# Patient Record
Sex: FEMALE | Race: BLACK OR AFRICAN AMERICAN | NOT HISPANIC OR LATINO | Employment: FULL TIME | URBAN - METROPOLITAN AREA
[De-identification: names, ages, dates, MRNs, and addresses within clinical notes are randomized per-mention and may not be internally consistent; named-entity substitution may affect disease eponyms.]

---

## 2021-04-16 ENCOUNTER — BULK ORDERING (OUTPATIENT)
Dept: CASE MANAGEMENT | Facility: OTHER | Age: 21
End: 2021-04-16

## 2021-04-16 DIAGNOSIS — Z23 IMMUNIZATION DUE: ICD-10-CM

## 2021-11-04 ENCOUNTER — HOSPITAL ENCOUNTER (EMERGENCY)
Facility: HOSPITAL | Age: 21
Discharge: HOME | End: 2021-11-04
Attending: EMERGENCY MEDICINE | Admitting: EMERGENCY MEDICINE

## 2021-11-04 VITALS
HEIGHT: 66 IN | WEIGHT: 243 LBS | BODY MASS INDEX: 39.05 KG/M2 | SYSTOLIC BLOOD PRESSURE: 150 MMHG | DIASTOLIC BLOOD PRESSURE: 99 MMHG | RESPIRATION RATE: 18 BRPM | OXYGEN SATURATION: 99 % | HEART RATE: 111 BPM | TEMPERATURE: 98.7 F

## 2021-11-04 DIAGNOSIS — L02.412 ABSCESS OF LEFT AXILLA: Primary | ICD-10-CM

## 2021-11-04 PROCEDURE — 99282 EMERGENCY DEPT VISIT SF MDM: CPT

## 2021-11-04 RX ORDER — LIDOCAINE HYDROCHLORIDE AND EPINEPHRINE 10; 10 MG/ML; UG/ML
10 INJECTION, SOLUTION INFILTRATION; PERINEURAL ONCE
Status: COMPLETED | OUTPATIENT
Start: 2021-11-04 | End: 2021-11-04

## 2021-11-04 RX ADMIN — LIDOCAINE HYDROCHLORIDE,EPINEPHRINE BITARTRATE 5 ML: 10; .01 INJECTION, SOLUTION INFILTRATION; PERINEURAL at 12:26

## 2021-11-04 NOTE — ED TRIAGE NOTES
Pt c/o abscess to left axilla that started couple weeks ago. Pt was seen at [facility] 1wk ago and placed on abx. Pt states that it is worse    Pt and staff wearing appropriated ppe

## 2021-11-04 NOTE — DISCHARGE INSTRUCTIONS
Keep your appointment with Dr. Lunsford tomorrow    Change the bandage 2-3 times daily.  After 2 days, remove the packing. Wash hands, remove gently, and then wash hands again. Anything that drains from this area is considered infections/contagious.    Return to the ER for fever >100.4, vomiting, spreading redness, increasing pain, any concerns.

## 2021-11-04 NOTE — ED NOTES
.Pt masked on arrival, RN wearing mask/goggles during encounter       Sergio Lagunas, RN  11/04/21 2418

## 2021-11-04 NOTE — ED PROVIDER NOTES
Pt presents to the ED complaining of infection under her left armpit.  Patient states she noticed swelling under her left armpit several weeks ago.  She states that last week she was seen at [facility] and was told that they could not open it but placed her antibiotics.  She states the area is continued to grow and that she has an appointment to see the surgeon-Dr. Lunsford tomorrow but felt like it is bothering her enough today she presented to our emergency room for evaluation.  She states that her mother has a history of the same and has had her sweat glands removed.    On exam, pt is alert and orient x3 no acute distress  Abscess in her left axilla with fluctuance and minimal surrounding erythema  Neurovascular intact in left arm    I agree with midlevel plan to I&D the wound, place her antibiotics and have her follow-up with surgery tomorrow as scheduled    PPE  Pt does not present with symptoms for COVID19; however, I was wearing a N95 mask and goggles throughout all patient interaction.        The [facility] and I have discussed this patient's history, physical exam, and treatment plan.  I have reviewed the documentation and personally had a face to face interaction with the patient. I affirm the documentation and agree with the treatment and plan.  The attached note describes my personal findings.           Daquan Duarte MD  11/04/21 5869

## 2021-11-04 NOTE — ED PROVIDER NOTES
EMERGENCY DEPARTMENT ENCOUNTER    Room Number:  A01/01  Date seen:  11/4/2021  Time seen: 12:43 EDT  PCP: Robert Mike MD  Historian: patient      HPI:  Chief Complaint: left underarm abscess    A complete HPI/ROS/PMH/PSH/SH/FH are unobtainable due to: none    Context: Lorna Mckenzie is a 21 y.o. female who presents to the ED for evaluation of an abscess in her left axilla present for approximately 2 weeks that is constant severe and is gradually getting worse.  She states she was evaluated a few days ago in an emergency room and prescribed antibiotics which did help some.  She denies any fevers chills known history of diabetes or vomiting.  She has an appointment with a surgeon tomorrow but states it was so uncomfortable she could not wait any longer.        PAST MEDICAL HISTORY  Active Ambulatory Problems     Diagnosis Date Noted   • No Active Ambulatory Problems     Resolved Ambulatory Problems     Diagnosis Date Noted   • No Resolved Ambulatory Problems     No Additional Past Medical History         PAST SURGICAL HISTORY  History reviewed. No pertinent surgical history.      FAMILY HISTORY  History reviewed. No pertinent family history.      SOCIAL HISTORY  Social History     Socioeconomic History   • Marital status: Single   Tobacco Use   • Smoking status: Current Every Day Smoker   Substance and Sexual Activity   • Alcohol use: Yes   • Drug use: Never         ALLERGIES  Patient has no known allergies.        REVIEW OF SYSTEMS  Review of Systems     All systems reviewed and negative except for those discussed in HPI.       PHYSICAL EXAM  ED Triage Vitals   Temp Heart Rate Resp BP SpO2   11/04/21 1142 11/04/21 1142 11/04/21 1142 11/04/21 1152 11/04/21 1142   98.7 °F (37.1 °C) (!) 122 18 150/99 99 %      Temp src Heart Rate Source Patient Position BP Location FiO2 (%)   -- 11/04/21 1142 -- -- --    Monitor            GENERAL: not distressed  HENT: atraumatic  EYES: no scleral icterus  CV: regular rhythm,  regular rate  RESPIRATORY: normal effort CTA B  ABDOMEN: Nondistended  MUSCULOSKELETAL: no deformity  NEURO: alert, moves all extremities, follows commands  SKIN: warm, dry.  Sick centimeter by 3 cm fluctuant mass in the left axilla that is very tender without any  surrounding erythema or induration    Vital signs and nursing notes reviewed.        PROCEDURES  Incision & Drainage    Date/Time: 11/4/2021 12:43 PM  Performed by: Layen Gómez PA  Authorized by: Daquan Duarte MD     Consent:     Consent obtained:  Verbal    Consent given by:  Patient    Risks discussed:  Bleeding, incomplete drainage, pain, infection and damage to other organs    Alternatives discussed:  No treatment  Location:     Type:  Abscess    Location:  Trunk (left axillla)  Pre-procedure details:     Skin preparation:  Betadine  Anesthesia (see MAR for exact dosages):     Anesthesia method:  Local infiltration    Local anesthetic:  Lidocaine 1% WITH epi  Procedure type:     Complexity:  Complex  Procedure details:     Incision types:  Single straight    Incision depth:  Dermal    Scalpel blade:  11    Wound management:  Probed and deloculated and irrigated with saline    Drainage:  Bloody and purulent    Drainage amount:  Copious    Wound treatment:  Wound left open    Packing materials:  1/2 in iodoform gauze  Post-procedure details:     Patient tolerance of procedure:  Tolerated well, no immediate complications            MEDICATIONS GIVEN IN ER  Medications   lidocaine 1% - EPINEPHrine [id number] (XYLOCAINE W/EPI) 1 %-1:[id number] injection 10 mL (5 mL Injection Given by Other 11/4/21 1226)             PROGRESS AND CONSULTS    DDX includes but not limited to abscess, cellulitis, mass    ED Course as of 11/04/21 1254   Thu Nov 04, 2021   1240 Medical chart reviewed.  ER visit on 10/27/2021 for left underarm abscess.  Was prescribed hydrocodone 5/3/2025 and Bactrim DS p.o. twice daily for 14 days. [KA]   1253 The patient tolerated the  procedure well.  Incision and drainage allowed a copious amount of drainage.  She is prescribed a 2-week course of antibiotics on the 27th, has several days left and have encouraged her to continue these and keep her appointment with general surgery tomorrow.  She is agreeable with the plan is stable for discharge.  I have counseled her to return for fever, vomiting and unable to tolerate her antibiotics or any concerns [KA]      ED Course User Index  [KA] Layne Gómez PA             Patient was placed in face mask in first look. Patient was wearing facemask each time I entered the room and throughout our encounter. I wore protective equipment throughout this patient encounter including a face mask, eye shield and gloves. Hand hygiene was performed before donning protective equipment and after removal when leaving the room.        DIAGNOSIS  Final diagnoses:   Abscess of left axilla         Follow Up:  Jonny Peterson MD  [address] [address]  [phone]      tomorrow as scheduled      RX:     Medication List      No changes were made to your prescriptions during this visit.           Latest Documented Vital Signs:  As of 12:54 EDT  BP- 150/99 HR- 111 Temp- 98.7 °F (37.1 °C) O2 sat- 99%       Layne Gómez PA  11/04/21 1254

## 2025-05-20 ENCOUNTER — APPOINTMENT (OUTPATIENT)
Dept: CT IMAGING | Facility: HOSPITAL | Age: 25
End: 2025-05-20
Payer: MEDICAID

## 2025-05-20 ENCOUNTER — HOSPITAL ENCOUNTER (EMERGENCY)
Facility: HOSPITAL | Age: 25
Discharge: HOME | End: 2025-05-20
Attending: STUDENT IN AN ORGANIZED HEALTH CARE EDUCATION/TRAINING PROGRAM | Admitting: STUDENT IN AN ORGANIZED HEALTH CARE EDUCATION/TRAINING PROGRAM
Payer: MEDICAID

## 2025-05-20 VITALS
DIASTOLIC BLOOD PRESSURE: 92 MMHG | WEIGHT: 244 LBS | RESPIRATION RATE: 16 BRPM | HEIGHT: 66 IN | TEMPERATURE: 97.6 F | HEART RATE: 93 BPM | OXYGEN SATURATION: 100 % | BODY MASS INDEX: 39.21 KG/M2 | SYSTOLIC BLOOD PRESSURE: 127 MMHG

## 2025-05-20 DIAGNOSIS — S00.83XA FOREHEAD CONTUSION, INITIAL ENCOUNTER: Primary | ICD-10-CM

## 2025-05-20 DIAGNOSIS — S09.90XA CHI (CLOSED HEAD INJURY), INITIAL ENCOUNTER: ICD-10-CM

## 2025-05-20 LAB — B-HCG UR QL: NEGATIVE

## 2025-05-20 PROCEDURE — 81025 URINE PREGNANCY TEST: CPT | Performed by: STUDENT IN AN ORGANIZED HEALTH CARE EDUCATION/TRAINING PROGRAM

## 2025-05-20 PROCEDURE — 99284 EMERGENCY DEPT VISIT MOD MDM: CPT

## 2025-05-20 PROCEDURE — 70450 CT HEAD/BRAIN W/O DYE: CPT

## 2025-05-20 RX ORDER — PAROXETINE 30 MG/1
30 TABLET, FILM COATED ORAL EVERY EVENING
COMMUNITY

## 2025-05-20 RX ORDER — BUTALBITAL, ACETAMINOPHEN AND CAFFEINE 50; 325; 40 MG/1; MG/1; MG/1
1 TABLET ORAL ONCE
Status: COMPLETED | OUTPATIENT
Start: 2025-05-20 | End: 2025-05-20

## 2025-05-20 RX ORDER — CLONAZEPAM 1 MG/1
1 TABLET ORAL 2 TIMES DAILY PRN
COMMUNITY

## 2025-05-20 RX ADMIN — BUTALBITAL, ACETAMINOPHEN AND CAFFEINE 1 TABLET: 325; 50; 40 TABLET ORAL at 11:39

## 2025-05-20 NOTE — ED PROVIDER NOTES
EMERGENCY DEPARTMENT ENCOUNTER  Room Number:  39/39  PCP: Priyanka Salinas APRN  Independent Historians: Patient      HPI:  Chief Complaint: had concerns including Facial Swelling.     A complete HPI/ROS/PMH/PSH/SH/FH are unobtainable due to: None    Chronic or social conditions impacting patient care (Social Determinants of Health): None      Context: Lorna Mckenzie is a 24 y.o. female with a medical history of hypertension who presents to the ED c/o acute swelling to left forehead and headache.  Patient reports she slipped and fell down the stairs a week ago.  Reports several second LOC.  Reports that her symptoms have not resolved.  Took Tylenol prior to arrival.    Patient denies aspirin or anticoagulant use    Review of prior external notes (non-ED) -and- Review of prior external test results outside of this encounter:  I reviewed [place] records from 1 week ago.  Patient endorsed alcohol and falling down stairs.  Hit her head and LOC.  Mother reports patient seems confused.  CT head and C-spine at that time without acute findings.  Discharged home with mother.      Prescription drug monitoring program review:         PAST MEDICAL HISTORY  Active Ambulatory Problems     Diagnosis Date Noted    No Active Ambulatory Problems     Resolved Ambulatory Problems     Diagnosis Date Noted    No Resolved Ambulatory Problems     Past Medical History:   Diagnosis Date    Depression     Hypertension          PAST SURGICAL HISTORY  History reviewed. No pertinent surgical history.      FAMILY HISTORY  History reviewed. No pertinent family history.      SOCIAL HISTORY  Social History     Socioeconomic History    Marital status: Single   Tobacco Use    Smoking status: Every Day   Substance and Sexual Activity    Alcohol use: Yes    Drug use: Never       ALLERGIES  Patient has no known allergies.      PHYSICAL EXAM    I have reviewed the triage vital signs and nursing notes.    ED Triage Vitals   Temp Heart Rate Resp BP SpO2    03/02/25 1448 03/02/25 1448 03/02/25 1448 03/02/25 1450 03/02/25 1448   97.4 °F (36.3 °C) 95 16 141/84 97 %      Temp src Heart Rate Source Patient Position BP Location FiO2 (%)   -- -- -- -- --              Physical Exam  GENERAL: alert, no acute distress  SKIN: Warm, dry  HENT: Normocephalic,  small contusion to the left forehead, tender to palpation.  No cervical tenderness to palpation  EYES: no scleral icterus  CV: regular rhythm, regular rate  RESPIRATORY: normal effort, lungs clear  ABDOMEN: soft, nondistended, nontender  MUSCULOSKELETAL: no deformity  NEURO: alert, moves all extremities, follows commands        LAB RESULTS  Recent Results (from the past 24 hours)   Pregnancy, Urine - Urine, Clean Catch    Collection Time: 05/20/25 11:44 AM    Specimen: Urine, Clean Catch   Result Value Ref Range    HCG, Urine QL Negative Negative       RADIOLOGY  No Radiology Exams Resulted Within Past 24 Hours      MEDICATIONS GIVEN IN ER  Medications   butalbital-acetaminophen-caffeine (FIORICET, ESGIC) -40 MG per tablet 1 tablet (1 tablet Oral Given 5/20/25 1139)         ORDERS PLACED DURING THIS VISIT:  Orders Placed This Encounter   Procedures    CT Head Without Contrast    Pregnancy, Urine - Urine, Clean Catch         OUTPATIENT MEDICATION MANAGEMENT:  No current Epic-ordered facility-administered medications on file.     Current Outpatient Medications Ordered in Epic   Medication Sig Dispense Refill    clonazePAM (KlonoPIN) 1 MG tablet Take 1 tablet by mouth 2 (Two) Times a Day As Needed for Anxiety.      PARoxetine (PAXIL) 30 MG tablet Take 1 tablet by mouth Every Evening.           PROCEDURES  Procedures            PROGRESS, DATA ANALYSIS, CONSULTS, AND MEDICAL DECISION MAKING  All labs have been independently interpreted by me.  All radiology studies have been reviewed by me. All EKG's have been independently viewed and interpreted by me.  Discussion below represents my analysis of pertinent findings  related to patient's condition, differential diagnosis, treatment plan and final disposition.    Differential diagnosis includes but is not limited to closed head injury, ICH, fracture.    Clinical Scores:                                       ED Course as of 05/20/25 1254   Tue May 20, 2025   1119 Patient presents to the ED for evaluation of swelling to left forehead and headache.  Patient reports she slipped and fell down the stairs a week ago.  Reports several second LOC.  Reports that her symptoms have not resolved.  Took Tylenol prior to arrival.    On exam patient has small hematoma to left forehead, no cervical tenderness, awake alert oriented    Will obtain CT head, give Fioricet for headache, patient is agreeable [DN]   1202 HCG, Urine QL: Negative [DN]   1202 CT Head Without Contrast  I reviewed patient's CT image(s), left forehead contusion, no ICH, interpreted by self  I reviewed the radiologist's interpretation of above image(s)   [DN]   1231 I discussed patient with radiologist, Dr. Miranda, forehead contusion, but no skull fracture or ICH [DN]   1252 I discussed results with patient, agreeable with plan for discharge home at this time [DN]      ED Course User Index  [DN] Gigi Bergman MD             AS OF 12:54 EDT VITALS:    BP - 127/92  HR - 93  TEMP - 97.6 °F (36.4 °C) (Tympanic)  O2 SATS - 100%    COMPLEXITY OF CARE  Admission was considered but after careful review of the patient's presentation, physical examination, diagnostic results, and response to treatment the patient may be safely discharged with outpatient follow-up.      DIAGNOSIS  Final diagnoses:   Forehead contusion, initial encounter   CHI (closed head injury), initial encounter         DISPOSITION  ED Disposition       ED Disposition   Discharge    Condition   Stable    Comment   --               New Medications Ordered This Visit   Medications    butalbital-acetaminophen-caffeine (FIORICET, ESGIC) -40 MG per tablet 1 tablet        Please note that portions of this document were completed with a voice recognition program.    Note Disclaimer: At [facility], we believe that sharing information builds trust and better relationships. You are receiving this note because you recently visited [facility]. It is possible you will see health information before a provider has talked with you about it. This kind of information can be easy to misunderstand. To help you fully understand what it means for your health, we urge you to discuss this note with your provider.         Gigi Bergman MD  05/20/25 1143       Gigi Bergman MD  05/20/25 7711

## 2025-05-20 NOTE — ED TRIAGE NOTES
Pt to ed from home via [facility]    Pt had fall May 13th and landed on her face. Pt c/o L facial pain/swelling.